# Patient Record
Sex: MALE | Race: WHITE | NOT HISPANIC OR LATINO | URBAN - METROPOLITAN AREA
[De-identification: names, ages, dates, MRNs, and addresses within clinical notes are randomized per-mention and may not be internally consistent; named-entity substitution may affect disease eponyms.]

---

## 2019-12-18 ENCOUNTER — EMERGENCY (EMERGENCY)
Facility: HOSPITAL | Age: 54
LOS: 1 days | Discharge: HOME | End: 2019-12-18
Attending: EMERGENCY MEDICINE | Admitting: EMERGENCY MEDICINE
Payer: COMMERCIAL

## 2019-12-18 VITALS
SYSTOLIC BLOOD PRESSURE: 133 MMHG | OXYGEN SATURATION: 96 % | RESPIRATION RATE: 17 BRPM | DIASTOLIC BLOOD PRESSURE: 87 MMHG | HEIGHT: 73 IN | WEIGHT: 240.08 LBS | HEART RATE: 78 BPM

## 2019-12-18 VITALS
OXYGEN SATURATION: 96 % | TEMPERATURE: 98 F | RESPIRATION RATE: 18 BRPM | DIASTOLIC BLOOD PRESSURE: 83 MMHG | SYSTOLIC BLOOD PRESSURE: 126 MMHG | HEART RATE: 60 BPM

## 2019-12-18 PROCEDURE — 72131 CT LUMBAR SPINE W/O DYE: CPT | Mod: 26

## 2019-12-18 PROCEDURE — 99284 EMERGENCY DEPT VISIT MOD MDM: CPT

## 2019-12-18 RX ORDER — KETOROLAC TROMETHAMINE 30 MG/ML
30 SYRINGE (ML) INJECTION ONCE
Refills: 0 | Status: DISCONTINUED | OUTPATIENT
Start: 2019-12-18 | End: 2019-12-18

## 2019-12-18 RX ORDER — MORPHINE SULFATE 50 MG/1
4 CAPSULE, EXTENDED RELEASE ORAL ONCE
Refills: 0 | Status: DISCONTINUED | OUTPATIENT
Start: 2019-12-18 | End: 2019-12-18

## 2019-12-18 RX ORDER — ACETAMINOPHEN 500 MG
650 TABLET ORAL ONCE
Refills: 0 | Status: COMPLETED | OUTPATIENT
Start: 2019-12-18 | End: 2019-12-18

## 2019-12-18 RX ORDER — METHOCARBAMOL 500 MG/1
1 TABLET, FILM COATED ORAL
Qty: 15 | Refills: 0
Start: 2019-12-18 | End: 2019-12-22

## 2019-12-18 RX ORDER — METHOCARBAMOL 500 MG/1
750 TABLET, FILM COATED ORAL ONCE
Refills: 0 | Status: COMPLETED | OUTPATIENT
Start: 2019-12-18 | End: 2019-12-18

## 2019-12-18 RX ORDER — OXYCODONE AND ACETAMINOPHEN 5; 325 MG/1; MG/1
1 TABLET ORAL ONCE
Refills: 0 | Status: DISCONTINUED | OUTPATIENT
Start: 2019-12-18 | End: 2019-12-18

## 2019-12-18 RX ORDER — IBUPROFEN 200 MG
1 TABLET ORAL
Qty: 20 | Refills: 0
Start: 2019-12-18 | End: 2019-12-22

## 2019-12-18 RX ADMIN — Medication 650 MILLIGRAM(S): at 10:11

## 2019-12-18 RX ADMIN — METHOCARBAMOL 750 MILLIGRAM(S): 500 TABLET, FILM COATED ORAL at 10:11

## 2019-12-18 RX ADMIN — OXYCODONE AND ACETAMINOPHEN 1 TABLET(S): 5; 325 TABLET ORAL at 11:57

## 2019-12-18 RX ADMIN — MORPHINE SULFATE 4 MILLIGRAM(S): 50 CAPSULE, EXTENDED RELEASE ORAL at 10:11

## 2019-12-18 RX ADMIN — Medication 30 MILLIGRAM(S): at 10:11

## 2019-12-18 NOTE — ED ADULT NURSE NOTE - ED COMFORT CARE
Explanation of wait/Patient informed Explanation of wait/Family informed/Darkened room/Patient informed

## 2019-12-18 NOTE — ED PROVIDER NOTE - PROGRESS NOTE DETAILS
Pt w spinal stenosis on CT, improved pain somewhat, no neuro symptoms. Will prescribe motrin/robaxin and percocet for breakthrough pain (3 days only). LE[facility] [place] to set up f/u w Spine/Pain Management.

## 2019-12-18 NOTE — ED PROVIDER NOTE - CHPI ED SYMPTOMS NEG
no constipation/no fever, chills, nausea, vomiting, urinary sx, no fever, chills, nausea, vomiting, urinary sx, numbness to the rectum or genitals, abd pain, chest pain, or SOB./no bladder dysfunction/no constipation

## 2019-12-18 NOTE — ED PROVIDER NOTE - MUSCULOSKELETAL, MLM
Tenderness to palpation in left paraspinal region and buttocks. Normal sensation, normal pulses in bilateral LE, capillary refill <2 seconds. Positive straight leg raise test on the left.

## 2019-12-18 NOTE — ED ADULT TRIAGE NOTE - CHIEF COMPLAINT QUOTE
c/o sciatica pain that's been acting up for 1week. worser today with movement. unable to sit. shooting fown left side

## 2019-12-18 NOTE — ED PROVIDER NOTE - OBJECTIVE STATEMENT
53 y/o M with PMHx GERD and sciatica presents to ED via walk-in c/o lower back pain that has been worsening over the past 1.5 weeks. Pt noticed his pain after a kickboxing class and has not worked out since then. He describes the pain as a cramping that radiates down his left leg with some associated left leg numbness. He now has difficulty getting out of bed and ambulating at his baseline level which prompted him to come to the ED. Pt has been taking 3 Advil BID to treat his sx; his most recent dose was last night. In the past, he has received muscle relaxers to treat his spasm. Denies fever, chills, N/V, urinary sx, constipation, numbness to the rectum or genitals, abd pain, chest pain, or SOB.

## 2019-12-18 NOTE — ED ADULT NURSE NOTE - CHPI ED NUR SYMPTOMS NEG
no bowel dysfunction/no neck tenderness/no bladder dysfunction/no numbness/no anorexia/no constipation/no fatigue/no motor function loss

## 2019-12-18 NOTE — ED PROVIDER NOTE - PATIENT PORTAL LINK FT
You can access the FollowMyHealth Patient Portal offered by [facility] by registering at the following website: [email or web address]. By joining [employer]’s FollowMyHealth portal, you will also be able to view your health information using other applications (apps) compatible with our system.

## 2019-12-18 NOTE — ED PROVIDER NOTE - CLINICAL SUMMARY MEDICAL DECISION MAKING FREE TEXT BOX
Will give Tylenol, anti-inflammatory, muscle relaxer, and morphine to treat likely muscle spasm. Will perform CT imaging since pt has never had CT spine in the past and has had multiple incidents involving similar sx.

## 2019-12-18 NOTE — ED ADULT NURSE NOTE - NSIMPLEMENTINTERV_GEN_ALL_ED
Implemented All Universal Safety Interventions:  [place] to call system. Call bell, personal items and telephone within reach. Instruct patient to call for assistance. Room bathroom lighting operational. Non-slip footwear when patient is off stretcher. Physically safe environment: no spills, clutter or unnecessary equipment. Stretcher in lowest position, wheels locked, appropriate side rails in place.

## 2019-12-18 NOTE — ED ADULT NURSE NOTE - PLAN OF CARE
Position of comfort/Explanation of exam/test Position of comfort/Fall precautions/Explanation of exam/test/Bedside visitors

## 2019-12-23 DIAGNOSIS — M48.061 SPINAL STENOSIS, LUMBAR REGION WITHOUT NEUROGENIC CLAUDICATION: ICD-10-CM

## 2019-12-23 DIAGNOSIS — R20.2 PARESTHESIA OF SKIN: ICD-10-CM

## 2019-12-23 DIAGNOSIS — M54.5 LOW BACK PAIN: ICD-10-CM

## 2019-12-29 PROBLEM — Z00.00 ENCOUNTER FOR PREVENTIVE HEALTH EXAMINATION: Status: ACTIVE | Noted: 2019-12-29

## 2020-01-02 ENCOUNTER — APPOINTMENT (OUTPATIENT)
Dept: PHYSICAL MEDICINE AND REHAB | Facility: CLINIC | Age: 55
End: 2020-01-02
Payer: COMMERCIAL

## 2020-01-02 VITALS
BODY MASS INDEX: 18.55 KG/M2 | RESPIRATION RATE: 16 BRPM | HEART RATE: 70 BPM | WEIGHT: 140 LBS | OXYGEN SATURATION: 95 % | HEIGHT: 73 IN

## 2020-01-02 PROCEDURE — 99204 OFFICE O/P NEW MOD 45 MIN: CPT

## 2020-01-02 RX ORDER — IBUPROFEN 600 MG/1
600 TABLET, FILM COATED ORAL
Refills: 0 | Status: ACTIVE | COMMUNITY

## 2020-01-02 RX ORDER — METHOCARBAMOL 750 MG/1
TABLET, FILM COATED ORAL
Refills: 0 | Status: ACTIVE | COMMUNITY

## 2020-01-02 RX ORDER — OXYCODONE HYDROCHLORIDE 30 MG/1
TABLET ORAL
Refills: 0 | Status: ACTIVE | COMMUNITY

## 2020-01-02 RX ORDER — METHYLPREDNISOLONE 8 MG/1
TABLET ORAL
Refills: 0 | Status: ACTIVE | COMMUNITY

## 2020-01-02 RX ORDER — CYCLOBENZAPRINE HYDROCHLORIDE 7.5 MG/1
TABLET, FILM COATED ORAL
Refills: 0 | Status: ACTIVE | COMMUNITY

## 2020-01-14 PROBLEM — K21.9 GASTRO-ESOPHAGEAL REFLUX DISEASE WITHOUT ESOPHAGITIS: Chronic | Status: ACTIVE | Noted: 2019-12-18

## 2020-01-14 PROBLEM — M54.30 SCIATICA, UNSPECIFIED SIDE: Chronic | Status: ACTIVE | Noted: 2019-12-18

## 2020-01-17 ENCOUNTER — APPOINTMENT (OUTPATIENT)
Dept: PHYSICAL MEDICINE AND REHAB | Facility: CLINIC | Age: 55
End: 2020-01-17
Payer: COMMERCIAL

## 2020-01-17 PROCEDURE — 64483 NJX AA&/STRD TFRM EPI L/S 1: CPT | Mod: LT

## 2020-01-17 PROCEDURE — 64484 NJX AA&/STRD TFRM EPI L/S EA: CPT | Mod: LT

## 2020-02-03 ENCOUNTER — APPOINTMENT (OUTPATIENT)
Dept: PHYSICAL MEDICINE AND REHAB | Facility: CLINIC | Age: 55
End: 2020-02-03
Payer: COMMERCIAL

## 2020-02-03 VITALS
BODY MASS INDEX: 18.55 KG/M2 | HEART RATE: 63 BPM | RESPIRATION RATE: 16 BRPM | WEIGHT: 140 LBS | OXYGEN SATURATION: 96 % | HEIGHT: 73 IN

## 2020-02-03 PROCEDURE — 99214 OFFICE O/P EST MOD 30 MIN: CPT

## 2020-09-18 ENCOUNTER — APPOINTMENT (OUTPATIENT)
Dept: PHYSICAL MEDICINE AND REHAB | Facility: CLINIC | Age: 55
End: 2020-09-18
Payer: COMMERCIAL

## 2020-09-18 VITALS — BODY MASS INDEX: 18.55 KG/M2 | RESPIRATION RATE: 16 BRPM | HEIGHT: 73 IN | WEIGHT: 140 LBS | OXYGEN SATURATION: 97 %

## 2020-09-18 PROCEDURE — 99214 OFFICE O/P EST MOD 30 MIN: CPT

## 2020-09-28 RX ORDER — METHOCARBAMOL 750 MG/1
750 TABLET, FILM COATED ORAL TWICE DAILY
Qty: 60 | Refills: 1 | Status: ACTIVE | COMMUNITY
Start: 2020-09-18 | End: 1900-01-01

## 2020-09-28 RX ORDER — IBUPROFEN 600 MG/1
600 TABLET, FILM COATED ORAL 3 TIMES DAILY
Qty: 90 | Refills: 1 | Status: ACTIVE | COMMUNITY
Start: 2020-09-18 | End: 1900-01-01

## 2020-10-30 ENCOUNTER — APPOINTMENT (OUTPATIENT)
Dept: PHYSICAL MEDICINE AND REHAB | Facility: CLINIC | Age: 55
End: 2020-10-30
Payer: COMMERCIAL

## 2020-10-30 VITALS — RESPIRATION RATE: 16 BRPM | OXYGEN SATURATION: 97 % | WEIGHT: 240 LBS | BODY MASS INDEX: 31.81 KG/M2 | HEIGHT: 73 IN

## 2020-10-30 VITALS — OXYGEN SATURATION: 97 % | WEIGHT: 240 LBS | RESPIRATION RATE: 16 BRPM | BODY MASS INDEX: 31.81 KG/M2 | HEIGHT: 73 IN

## 2020-10-30 PROCEDURE — 99214 OFFICE O/P EST MOD 30 MIN: CPT

## 2020-10-30 PROCEDURE — 99072 ADDL SUPL MATRL&STAF TM PHE: CPT

## 2020-10-30 NOTE — ASSESSMENT
[FreeTextEntry1] : Impression:\par 1. Left L4/5 Radiculopathy\par 2. Right C6/7 Radiculopathy\par \par Plan: Review of the history, physical examination, and imaging, the patient's symptoms remain consistent with Left L4/5 Radiculopathy along with new Right C6/7 Radiculopathy. The diagnoses were reviewed with the patient. We reviewed potential treatment options including physical therapy, oral medication, interventional spine procedures, and surgery; as well as alternative therapeutics such as acupuncture and massage. We also reviewed the importance of biomechanics, ergonomics, and posture in the long term management of the condition. \par At this time the patient is interested in interventional options for symptom reduction. I have offered the patient the option of an epidural steroid injection. We discussed all the risks, benefits, alternative treatments, and prognosis. The patient expressed understanding and would like to move forward. We will schedule for the injection as well as follow up post-procedure. We will plan for Right C7-T1 ILESI. We will also plan for repeat lumbar VIOLETA. The patient expressed verbal understanding and is in agreement with the plan of care. All of the patient's questions and concerns were addressed during today's visit.\par

## 2020-10-30 NOTE — PHYSICAL EXAM
[FreeTextEntry1] : GEN: AAOx3, NAD.\par PSYCH: Normal mood and affect. Responds appropriately to commands.\par EYES: Sclerae Anicteric. No discharge. EOMI.\par RESP: Breathing unlabored.\par CV: DP pulses 2+ and equal. No varicosities noted.\par EXT: No C/C/E.\par SKIN: No lesions noted.\par \par LUMBAR\par STRENGTH: 4+/5 L-EHL. 5/5 bilateral hip flexors, knee extensors, knee flexors, ankle dorsiflexors, ankle plantar flexors, hip extensors, hip abductors.\par TONE: Normal, No clonus.\par REFLEXES: 2+ symmetric patella, medial hamstring, Right achilles, 1+ Left achilles. Plantars downgoing bilaterally.\par SENS: Grossly intact to light touch bilateral lower extremities.\par INSP: Spine alignment is midline, with no evidence of scoliosis.\par STANCE: No Trendelenburg with single leg stance.\par GAIT: Non antalgic, normal reciprocating heel to toe\par LUMBAR ROM: Flexion limited w/ radicular Sx. Extension, side-bending, rotation, oblique extension all full and pain free.  \par HIP ROM: Full and pain free bilaterally.\par PALP: There is no tenderness over the midline spinous processes, paravertebral muscles, SIJ, or greater trochanters bilaterally.\par SPECIAL: SLR and Slump (+) Left w/ crossover Right. FADIR, DESTINEE negative bilaterally. \par \par CERVICAL\par LYMPH: No supraclavicular, anterior or posterior cervical lymphadenopathy appreciated.\par STRENGTH: 4/5 R-. 5/5 BUE\par TONE: Normal, No clonus.\par REFLEXES: 2+ symmetric biceps, triceps, brachioradialis, pronator teres. Wade's negative bilaterally.\par SENSATION: Grossly intact to light touch bilateral upper extremities.\par INSP: Spine alignment is midline, with no evidence of scoliosis.\par CERVICAL ROM: Flexion, extension, side-bending, rotation, oblique extension all full and pain free.  \par SHOULDER ROM: Full and pain free bilaterally.\par PALP: There is no tenderness over the midline spinous processes, paravertebral muscles, trapezius, levator scapulae or shoulder region bilaterally.\par SPECIAL: Spurling's maneuver negative bilaterally. Axial Compression negative. \par \par

## 2020-10-30 NOTE — HISTORY OF PRESENT ILLNESS
[FreeTextEntry1] : Mr. 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 Pain remains primarily on his lower back radiating to his left leg intermittent in nature and described as dull/achy with some residual numbness/tingling in the left foot. The pain is rated as 1/10 during today's visit, and ranges from 1-5/10. The patient's symptoms are aggravated by sitting, laying down and alleviated by medication, walking or stretching. The patient also continues to complain of neck pain radiating to the right arm, with weakness in his right hand.The patient denies any night pain, numbness/tingling, weakness, or bowel/bladder dysfunction.  The patient has no other complaints at this time.

## 2020-10-30 NOTE — DATA REVIEWED
[FreeTextEntry1] : CT LS 12/2019: L4/5 central disc protrusion with with facet and ligamentum flavum hypertrophy results in spinal canal stenosis and right neural foraminal narrowing. \par \par MR CS 10/2020: C4-5: Posterior central disc herniation impinges the cord. C5-6: right paracentral and foraminal disc\par osteophyte complex impinging the cord and exiting right C6 nerve root. C6-C7: right paracentral and foraminal disc\par herniation impinge the cord and exiting right C7 nerve root. C7-T1: right paracentral and foraminal disc herniation impinges the cord and exiting right C8 nerve root.

## 2020-11-09 PROBLEM — M54.12 CERVICAL RADICULOPATHY: Status: ACTIVE | Noted: 2020-09-18

## 2020-11-10 ENCOUNTER — APPOINTMENT (OUTPATIENT)
Dept: PHYSICAL MEDICINE AND REHAB | Facility: CLINIC | Age: 55
End: 2020-11-10
Payer: COMMERCIAL

## 2020-11-10 DIAGNOSIS — M54.12 RADICULOPATHY, CERVICAL REGION: ICD-10-CM

## 2020-11-10 PROCEDURE — 62321 NJX INTERLAMINAR CRV/THRC: CPT

## 2020-11-19 ENCOUNTER — APPOINTMENT (OUTPATIENT)
Dept: PHYSICAL MEDICINE AND REHAB | Facility: CLINIC | Age: 55
End: 2020-11-19
Payer: COMMERCIAL

## 2020-11-19 DIAGNOSIS — M51.16 INTERVERTEBRAL DISC DISORDERS WITH RADICULOPATHY, LUMBAR REGION: ICD-10-CM

## 2020-11-19 PROCEDURE — 64484 NJX AA&/STRD TFRM EPI L/S EA: CPT | Mod: LT

## 2020-11-19 PROCEDURE — 64483 NJX AA&/STRD TFRM EPI L/S 1: CPT | Mod: LT

## 2022-05-17 NOTE — ED PROVIDER NOTE - CCCP TRG CHIEF CMPLNT
Consent: The patient's consent was obtained including but not limited to risks of crusting, scabbing, blistering, scarring, darker or lighter pigmentary change, recurrence, incomplete removal and infection. Show Aperture Variable?: Yes back pain general Post-Care Instructions: I reviewed with the patient in detail post-care instructions. Patient is to wear sunprotection, and avoid picking at any of the treated lesions. Pt may apply Vaseline to crusted or scabbing areas. Detail Level: Detailed Number Of Freeze-Thaw Cycles: 1 freeze-thaw cycle Render Post-Care Instructions In Note?: no Duration Of Freeze Thaw-Cycle (Seconds): 0